# Patient Record
Sex: MALE | Race: WHITE | URBAN - METROPOLITAN AREA
[De-identification: names, ages, dates, MRNs, and addresses within clinical notes are randomized per-mention and may not be internally consistent; named-entity substitution may affect disease eponyms.]

---

## 2019-08-22 ENCOUNTER — OFFICE VISIT (OUTPATIENT)
Dept: URBAN - METROPOLITAN AREA CLINIC 25 | Facility: CLINIC | Age: 34
End: 2019-08-22
Payer: COMMERCIAL

## 2019-08-22 PROCEDURE — 92014 COMPRE OPH EXAM EST PT 1/>: CPT | Performed by: OPTOMETRIST

## 2019-08-22 PROCEDURE — 92310 CONTACT LENS FITTING OU: CPT | Performed by: OPTOMETRIST

## 2019-08-22 ASSESSMENT — KERATOMETRY
OD: 44.19
OS: 43.62

## 2019-08-22 ASSESSMENT — VISUAL ACUITY
OS: 20/20
OD: 20/20

## 2019-08-22 ASSESSMENT — INTRAOCULAR PRESSURE
OD: 15
OS: 14

## 2021-04-22 ENCOUNTER — OFFICE VISIT (OUTPATIENT)
Dept: URBAN - METROPOLITAN AREA CLINIC 22 | Facility: CLINIC | Age: 36
End: 2021-04-22
Payer: COMMERCIAL

## 2021-04-22 DIAGNOSIS — H52.223 REGULAR ASTIGMATISM, BILATERAL: Primary | ICD-10-CM

## 2021-04-22 PROCEDURE — 92014 COMPRE OPH EXAM EST PT 1/>: CPT | Performed by: STUDENT IN AN ORGANIZED HEALTH CARE EDUCATION/TRAINING PROGRAM

## 2021-04-22 PROCEDURE — 92310 CONTACT LENS FITTING OU: CPT | Performed by: STUDENT IN AN ORGANIZED HEALTH CARE EDUCATION/TRAINING PROGRAM

## 2021-04-22 ASSESSMENT — KERATOMETRY
OS: 43.72
OD: 45.05

## 2021-04-22 ASSESSMENT — INTRAOCULAR PRESSURE
OS: 16
OD: 15

## 2021-04-22 ASSESSMENT — VISUAL ACUITY
OS: 20/20
OD: 20/20

## 2021-04-22 NOTE — IMPRESSION/PLAN
Impression: Regular astigmatism, bilateral: H52.223. Plan: Discussed findings. New glasses and contact lens Rx finalized and given to patient.

## 2022-04-28 ENCOUNTER — OFFICE VISIT (OUTPATIENT)
Dept: URBAN - METROPOLITAN AREA CLINIC 22 | Facility: CLINIC | Age: 37
End: 2022-04-28
Payer: COMMERCIAL

## 2022-04-28 DIAGNOSIS — H52.223 REGULAR ASTIGMATISM, BILATERAL: Primary | ICD-10-CM

## 2022-04-28 PROCEDURE — 92014 COMPRE OPH EXAM EST PT 1/>: CPT | Performed by: STUDENT IN AN ORGANIZED HEALTH CARE EDUCATION/TRAINING PROGRAM

## 2022-04-28 PROCEDURE — 92310 CONTACT LENS FITTING OU: CPT | Performed by: STUDENT IN AN ORGANIZED HEALTH CARE EDUCATION/TRAINING PROGRAM

## 2022-04-28 ASSESSMENT — INTRAOCULAR PRESSURE
OS: 15
OD: 14

## 2022-04-28 ASSESSMENT — VISUAL ACUITY
OD: 20/20
OS: 20/20

## 2022-04-28 ASSESSMENT — KERATOMETRY
OS: 44.00
OD: 44.13

## 2023-07-20 ENCOUNTER — OFFICE VISIT (OUTPATIENT)
Dept: URBAN - METROPOLITAN AREA CLINIC 22 | Facility: CLINIC | Age: 38
End: 2023-07-20
Payer: COMMERCIAL

## 2023-07-20 DIAGNOSIS — H52.223 REGULAR ASTIGMATISM, BILATERAL: Primary | ICD-10-CM

## 2023-07-20 PROCEDURE — 92310 CONTACT LENS FITTING OU: CPT | Performed by: STUDENT IN AN ORGANIZED HEALTH CARE EDUCATION/TRAINING PROGRAM

## 2023-07-20 PROCEDURE — 92014 COMPRE OPH EXAM EST PT 1/>: CPT | Performed by: STUDENT IN AN ORGANIZED HEALTH CARE EDUCATION/TRAINING PROGRAM

## 2023-07-20 ASSESSMENT — INTRAOCULAR PRESSURE
OD: 10
OS: 11

## 2023-07-20 ASSESSMENT — VISUAL ACUITY
OD: 20/20
OS: 20/20

## 2024-06-11 ENCOUNTER — OFFICE VISIT (OUTPATIENT)
Dept: URBAN - METROPOLITAN AREA CLINIC 22 | Facility: CLINIC | Age: 39
End: 2024-06-11
Payer: COMMERCIAL

## 2024-06-11 DIAGNOSIS — H52.223 REGULAR ASTIGMATISM, BILATERAL: Primary | ICD-10-CM

## 2024-06-11 PROCEDURE — 92310 CONTACT LENS FITTING OU: CPT | Performed by: STUDENT IN AN ORGANIZED HEALTH CARE EDUCATION/TRAINING PROGRAM

## 2024-06-11 PROCEDURE — 92014 COMPRE OPH EXAM EST PT 1/>: CPT | Performed by: STUDENT IN AN ORGANIZED HEALTH CARE EDUCATION/TRAINING PROGRAM

## 2024-06-11 ASSESSMENT — VISUAL ACUITY
OS: 20/20
OD: 20/20

## 2024-06-11 ASSESSMENT — INTRAOCULAR PRESSURE
OD: 13
OS: 13

## 2025-06-17 ENCOUNTER — OFFICE VISIT (OUTPATIENT)
Dept: URBAN - METROPOLITAN AREA CLINIC 22 | Facility: CLINIC | Age: 40
End: 2025-06-17
Payer: COMMERCIAL

## 2025-06-17 DIAGNOSIS — H52.223 REGULAR ASTIGMATISM, BILATERAL: Primary | ICD-10-CM

## 2025-06-17 PROCEDURE — 92014 COMPRE OPH EXAM EST PT 1/>: CPT | Performed by: STUDENT IN AN ORGANIZED HEALTH CARE EDUCATION/TRAINING PROGRAM

## 2025-06-17 PROCEDURE — 92310 CONTACT LENS FITTING OU: CPT | Performed by: STUDENT IN AN ORGANIZED HEALTH CARE EDUCATION/TRAINING PROGRAM

## 2025-06-17 ASSESSMENT — INTRAOCULAR PRESSURE
OD: 11
OS: 12

## 2025-06-17 ASSESSMENT — VISUAL ACUITY
OD: 20/20
OS: 20/20

## 2025-07-10 ENCOUNTER — OFFICE VISIT (OUTPATIENT)
Dept: URBAN - METROPOLITAN AREA CLINIC 22 | Facility: CLINIC | Age: 40
End: 2025-07-10

## 2025-07-10 DIAGNOSIS — H52.223 REGULAR ASTIGMATISM, BILATERAL: Primary | ICD-10-CM

## 2025-07-10 PROCEDURE — 92310 CONTACT LENS FITTING OU: CPT | Performed by: STUDENT IN AN ORGANIZED HEALTH CARE EDUCATION/TRAINING PROGRAM
